# Patient Record
Sex: FEMALE | Race: WHITE
[De-identification: names, ages, dates, MRNs, and addresses within clinical notes are randomized per-mention and may not be internally consistent; named-entity substitution may affect disease eponyms.]

---

## 2017-06-27 ENCOUNTER — HOSPITAL ENCOUNTER (OUTPATIENT)
Dept: HOSPITAL 47 - RADMRIMAIN | Age: 57
Discharge: HOME | End: 2017-06-27
Payer: COMMERCIAL

## 2017-06-27 DIAGNOSIS — M51.16: ICD-10-CM

## 2017-06-27 DIAGNOSIS — M24.28: ICD-10-CM

## 2017-06-27 DIAGNOSIS — M51.17: ICD-10-CM

## 2017-06-27 DIAGNOSIS — M46.86: ICD-10-CM

## 2017-06-27 DIAGNOSIS — M43.16: Primary | ICD-10-CM

## 2017-06-27 PROCEDURE — 72158 MRI LUMBAR SPINE W/O & W/DYE: CPT

## 2017-06-27 NOTE — MR
EXAMINATION TYPE: MR lumbar spine wo/w con

 

DATE OF EXAM: 6/27/2017

 

COMPARISON: NONE

 

HISTORY: 57-year-old female with left hip pain x8 months

 

Technique: Multiplanar, multisequence images of the lumbar spine were obtained before and after admin
istration of 20 mL intravenous MultiHance gadolinium contrast.  

 

FINDINGS: 

Vertebral body heights are preserved.

 

Scattered Modic type II fatty endplate change particularly anteriorly from L2 through L5 levels and s
ome Modic type III sclerotic changes anteriorly at L5-S1. Otherwise, no suspicious bone marrow replac
ement.

 

Conus medullaris is normal.

 

There is facet arthropathy mid to lower lumbar spine with grade 1 retrolisthesis at L4-L5.

 

The intervertebral discs are degenerated, desiccated, narrowed, and diffusely bulging. Severe narrowi
ng at L5-S1 and moderate at L4-L5 with disc vacuum.

 

At T12-L1, no spinal canal or foraminal stenosis.

 

At L1-L2, no spinal canal or neuroforaminal stenosis.

 

At L2-L3, minimal diffuse disc bulge without spinal canal or neuroforaminal stenosis.

 

At L3-L4, mild diffuse disc bulge with small posterior annular fissure. No significant spinal canal o
r neuroforaminal stenosis.

 

L4-L5, there is facet arthropathy with ligamentum flavum thickening and diffuse disc bulge changes poe
spected to contact and possibly impinges the traversing left L5 nerve root. There is mild bilateral n
euroforaminal stenosis without significant spinal canal stenosis.

 

At L5-S1, facet arthropathy with a bulging disc. Disc material closely approaches and may contact the
 traversing S1 nerve roots. No significant spinal canal or neuroforaminal stenosis.

 

No abnormal enhancement within the spinal canal.

 

Ectasia of the infrarenal abdominal aorta measuring up to 2.6 cm. No prevertebral or paravertebral so
ft tissue abnormality seen.

 

 

IMPRESSION: 

 

1. Degenerative disc disease, most advanced at L5-S1 and then at L4-L5. Additional facet arthropathy 
and ligamentum flavum thickening in the lower lumbar spine.

2. Trace degenerative grade 1 retrolisthesis at L4-L5.

3. At L4-L5, the traversing left L5 nerve root may be impinged by the degenerative changes and bulgin
g disc.

4. Disc material closely approaches and may contact the traversing S1 nerve roots at L5-S1.

## 2017-07-19 ENCOUNTER — HOSPITAL ENCOUNTER (OUTPATIENT)
Dept: HOSPITAL 47 - RADUSWWP | Age: 57
Discharge: HOME | End: 2017-07-19
Payer: COMMERCIAL

## 2017-07-19 DIAGNOSIS — I77.811: Primary | ICD-10-CM

## 2017-07-19 PROCEDURE — 93979 VASCULAR STUDY: CPT

## 2017-07-19 NOTE — US
EXAMINATION TYPE: US duplex aorta

 

DATE OF EXAM: 7/19/2017

 

COMPARISON: MRI lumbar spine June 27, 2017 2017

 

CLINICAL HISTORY: I77.811 Abdominal Aortic Ectasia' 2.6cm infrarenal aortic ectasia noted on MRI; Smo
ker since teenager  

 

EXAM MEASUREMENTS:

 

Abdominal Aorta:

** Proximal:  2.8cm A/P

** Mid:  2.3cm A/P

** Distal:  2.6cm Transverse

** Bifurcation:  1.4cm A/P Right KOFI,    1.1cm A/P Left KOFI

 

Intimal thickening is noted especially at distal aorta and into KOFI. 

 

Prominence of aorta is confirmed but no greater than 3 cm aneurysmal dilatation is seen

 

IMPRESSION: No greater than 3 cm aneurysmal change to the abdominal aorta.

## 2017-07-25 ENCOUNTER — HOSPITAL ENCOUNTER (OUTPATIENT)
Dept: HOSPITAL 47 - WWCWWP | Age: 57
End: 2017-07-25
Payer: COMMERCIAL

## 2017-07-25 DIAGNOSIS — Z12.31: Primary | ICD-10-CM
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
## 2017-07-26 NOTE — MM
Reason for exam: screening  (asymptomatic).

Last mammogram was performed 1 year and 3 months ago.



History:

Patient is postmenopausal, has history of endometrial cancer at age 33, and is 

nulliparous.

Benign stereotactic core biopsy of the right breast, August 9, 2001.  Core biopsy 

of the right breast.



Physical Findings:

A clinical breast exam by your physician is recommended on an annual basis and 

results should be correlated with mammographic findings.



MG Screening Mammo w CAD

Bilateral CC and MLO view(s) were taken.

Prior study comparison: April 19, 2016, bilateral MG screening mammo w CAD.  March 31, 2015, bilateral MG screening mammo w CAD.

There are scattered fibroglandular densities.  Stable benign calcifications. There

is no discrete abnormality.  No significant changes when compared with prior 

studies.





ASSESSMENT: Benign, BI-RAD 2



RECOMMENDATION:

Routine screening mammogram of both breasts in 1 year.

## 2017-09-05 ENCOUNTER — HOSPITAL ENCOUNTER (OUTPATIENT)
Dept: HOSPITAL 47 - RADCTMAIN | Age: 57
Discharge: HOME | End: 2017-09-05
Payer: COMMERCIAL

## 2017-09-05 DIAGNOSIS — R91.1: Primary | ICD-10-CM

## 2017-09-05 PROCEDURE — 71250 CT THORAX DX C-: CPT

## 2017-09-06 NOTE — CT
EXAMINATION TYPE: CT chest wo con

 

DATE OF EXAM: 9/5/2017

 

COMPARISON: Outside CT 8/15/2017

 

HISTORY: Abnormal CT at [facility] per patient. Pulmonary nodule at base of lung.

 

CT DLP: 732.00 mGycm.  Automated Exposure Control for Dose Reduction was Utilized.

 

 

TECHNIQUE:  CT scan of the thorax is performed without IV contrast.

 

FINDINGS:

 

LUNGS: The lungs are remarkable for a subpleural nodule and axial image 20 measuring approximately 5 
mm immediately adjacent 2 mm nodule. The previously described nodule in the right middle lobe is not 
seen definitively. There is no pleural effusion.

 

MEDIASTINUM: Lack of IV contrast is noted to limit evaluation for mediastinal and especially hilar ad
enopathy. There are no definitive greater than 1 cm hilar or mediastinal lymph nodes.   No cardiomega
ly or significant pericardial effusion is seen, minimal pericardial fluid noted. 

 

OTHER:  No additional significant abnormality is seen. Patient is post cholecystectomy.

 

IMPRESSION: Nonspecific subcentimeter pulmonary nodule, repeat CT to assess for stability in 6-12 mon
ths.

## 2018-08-28 ENCOUNTER — HOSPITAL ENCOUNTER (OUTPATIENT)
Dept: HOSPITAL 47 - WWCWWP | Age: 58
Discharge: HOME | End: 2018-08-28
Payer: COMMERCIAL

## 2018-08-28 VITALS — TEMPERATURE: 98.3 F | DIASTOLIC BLOOD PRESSURE: 53 MMHG | HEART RATE: 89 BPM | SYSTOLIC BLOOD PRESSURE: 115 MMHG

## 2018-08-28 VITALS — BODY MASS INDEX: 33 KG/M2

## 2018-08-28 DIAGNOSIS — Z12.31: Primary | ICD-10-CM

## 2018-08-28 PROCEDURE — 77067 SCR MAMMO BI INCL CAD: CPT
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
## 2018-08-30 NOTE — MM
Reason for exam: screening  (asymptomatic).

Last mammogram was performed 1 year and 1 month ago.



History:

Patient is postmenopausal, has history of endometrial cancer at age 33, and is 

nulliparous.

Benign stereotactic core biopsy of the right breast, August 9, 2001.  Core biopsy 

of the right breast.



Physical Findings:

A clinical breast exam by your physician is recommended on an annual basis and 

results should be correlated with mammographic findings.



MG Screening Mammo w CAD

Bilateral CC and MLO view(s) were taken.

Prior study comparison: July 25, 2017, bilateral MG screening mammo w CAD.  April 19, 2016, bilateral MG screening mammo w CAD.

There is chronic nodularity in the right breast.  No significant changes when 

compared with prior studies.





ASSESSMENT: Benign, BI-RAD 2



RECOMMENDATION:

Routine screening mammogram of both breasts in 1 year.

## 2018-09-18 ENCOUNTER — HOSPITAL ENCOUNTER (OUTPATIENT)
Dept: HOSPITAL 47 - RADBDWWP | Age: 58
End: 2018-09-18
Payer: COMMERCIAL

## 2018-09-18 DIAGNOSIS — M81.0: Primary | ICD-10-CM

## 2018-09-18 PROCEDURE — 77080 DXA BONE DENSITY AXIAL: CPT

## 2018-09-19 NOTE — BD
EXAMINATION TYPE: Axial Bone Density

 

DATE OF EXAM: 9/18/2018

 

COMPARISON: NONE

 

CLINICAL HISTORY:

 

Height:  65.5 IN

Weight:  205 LBS

 

FRAX RISK QUESTIONS:

Secondary Osteoporosis:

    3.  Menopause before 45: YES AGE 33

  Current Tobacco Use: YES

 

RISK FACTORS 

HISTORY OF: 

Active: NO

Diet low in dairy products/other sources of calcium:  YES

Postmenopausal woman: AGE 33

 

MEDICATIONS: 

Additional Medications: VIT D, KEFLEX, ASPIRIN, BLOOD PRESSURE MEDS, WATER PILL, CHOLESTEROL MED, NOR
CO,  

 

 

Additional History: CERVICAL CANCER WITH RADIATION AGE 33

 

 

EXAM MEASUREMENTS: 

Bone mineral densitometry was performed using the [employer] System.

Bone mineral density as measured about the Lumbar spine is:  

----- L1-L4(G/cm2): 0.967

T Score Values are as follows:

----- L2: -2.0

----- L3: -1.7

----- L4: -0.6

----- L1-L4: -1.8

Bone mineral density BASELINE

 

Bone mineral density about the R hip (g/cm2): 0.664

Bone mineral density about the L hip (g/cm2): 0.652

T Score values are as follows:

-----R Neck: -2.7

-----L Neck: -2.8

-----R Total: -2.8

-----L Total: -2.4

Bone mineral density BASELINE

 

 

IMPRESSION:

Osteoporosis (T Score less than -2.5).

 

There is increased fracture risk and therapy is usually indicated based on age.

 

Re-Screen 1-2 years.

 

 

 

 

 

NOTE:  T-SCORE=SD OF THE YOUNG ADULT MEAN.

## 2019-09-11 ENCOUNTER — HOSPITAL ENCOUNTER (OUTPATIENT)
Dept: HOSPITAL 47 - WWCWWP | Age: 59
Discharge: HOME | End: 2019-09-11
Attending: OBSTETRICS & GYNECOLOGY
Payer: COMMERCIAL

## 2019-09-11 VITALS
TEMPERATURE: 98.7 F | SYSTOLIC BLOOD PRESSURE: 95 MMHG | RESPIRATION RATE: 18 BRPM | HEART RATE: 114 BPM | DIASTOLIC BLOOD PRESSURE: 60 MMHG

## 2019-09-11 VITALS — BODY MASS INDEX: 35.2 KG/M2

## 2019-09-11 DIAGNOSIS — Z12.31: Primary | ICD-10-CM

## 2019-09-11 PROCEDURE — 77067 SCR MAMMO BI INCL CAD: CPT
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
## 2019-09-12 NOTE — MM
Reason for exam: screening  (asymptomatic).

Last mammogram was performed 1 year ago.



History:

Patient is postmenopausal, has history of endometrial cancer at age 33, and is 

nulliparous.

Benign stereotactic core biopsy of the right breast, August 9, 2001.  Core biopsy 

of the right breast.



Physical Findings:

A clinical breast exam by your physician is recommended on an annual basis and 

results should be correlated with mammographic findings.



MG Screening Mammo w CAD

Bilateral CC and MLO view(s) were taken.

Prior study comparison: August 28, 2018, bilateral MG screening mammo w CAD.  July 25, 2017, bilateral MG screening mammo w CAD.

There are scattered fibroglandular densities.  Stable benign calcifications. There

is no discrete abnormality.  No significant changes when compared with prior 

studies.





ASSESSMENT: Benign, BI-RAD 2



RECOMMENDATION:

Routine screening mammogram of both breasts in 1 year.

## 2020-11-24 ENCOUNTER — HOSPITAL ENCOUNTER (OUTPATIENT)
Dept: HOSPITAL 47 - WWCWWP | Age: 60
Discharge: HOME | End: 2020-11-24
Attending: OBSTETRICS & GYNECOLOGY
Payer: COMMERCIAL

## 2020-11-24 VITALS
SYSTOLIC BLOOD PRESSURE: 114 MMHG | HEART RATE: 79 BPM | DIASTOLIC BLOOD PRESSURE: 77 MMHG | RESPIRATION RATE: 20 BRPM | TEMPERATURE: 98 F

## 2020-11-24 DIAGNOSIS — Z12.31: Primary | ICD-10-CM

## 2020-11-24 PROCEDURE — 77067 SCR MAMMO BI INCL CAD: CPT
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
## 2020-11-25 NOTE — MM
Reason for exam: screening  (asymptomatic).

Last mammogram was performed 1 year and 2 months ago.



History:

Patient is postmenopausal, has history of endometrial cancer at age 33, and is 

nulliparous.

Benign stereotactic core biopsy of the right breast, August 9, 2001.  Core biopsy 

of the right breast.



Physical Findings:

A clinical breast exam by your physician is recommended on an annual basis and 

results should be correlated with mammographic findings.



MG Screening Mammo w CAD

Bilateral CC and MLO view(s) were taken.

Prior study comparison: September 11, 2019, bilateral MG screening mammo w CAD.  

August 28, 2018, bilateral MG screening mammo w CAD.

There are scattered fibroglandular densities.  Stable benign calcifications. There

is no discrete abnormality.  No significant changes when compared with prior 

studies.





ASSESSMENT: Benign, BI-RAD 2



RECOMMENDATION:

Routine screening mammogram of both breasts in 1 year.

## 2022-01-25 ENCOUNTER — HOSPITAL ENCOUNTER (OUTPATIENT)
Dept: HOSPITAL 47 - WWCWWP | Age: 62
End: 2022-01-25
Attending: OBSTETRICS & GYNECOLOGY
Payer: COMMERCIAL

## 2022-01-25 VITALS
TEMPERATURE: 98.8 F | RESPIRATION RATE: 18 BRPM | SYSTOLIC BLOOD PRESSURE: 103 MMHG | DIASTOLIC BLOOD PRESSURE: 69 MMHG | HEART RATE: 82 BPM

## 2022-01-25 DIAGNOSIS — Z79.899: ICD-10-CM

## 2022-01-25 DIAGNOSIS — Z88.2: ICD-10-CM

## 2022-01-25 DIAGNOSIS — N36.41: ICD-10-CM

## 2022-01-25 DIAGNOSIS — Z12.31: Primary | ICD-10-CM

## 2022-01-25 DIAGNOSIS — F32.A: ICD-10-CM

## 2022-01-25 DIAGNOSIS — Z86.19: ICD-10-CM

## 2022-01-25 DIAGNOSIS — Z87.891: ICD-10-CM

## 2022-01-25 DIAGNOSIS — E78.5: ICD-10-CM

## 2022-01-25 DIAGNOSIS — Z88.8: ICD-10-CM

## 2022-01-25 DIAGNOSIS — Z88.1: ICD-10-CM

## 2022-01-25 DIAGNOSIS — Z87.310: ICD-10-CM

## 2022-01-25 PROCEDURE — 77067 SCR MAMMO BI INCL CAD: CPT
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
## 2022-01-26 NOTE — MM
Reason for exam: screening  (asymptomatic).

Last mammogram was performed 1 year and 2 months ago.



History:

Patient is postmenopausal, has history of endometrial cancer at age 33, and is 

nulliparous.

Benign stereotactic core biopsy of the right breast, August 9, 2001.  Core biopsy 

of the right breast.



Physical Findings:

A clinical breast exam by your physician is recommended on an annual basis and 

results should be correlated with mammographic findings.



MG Screening Mammo w CAD

Bilateral CC and MLO view(s) were taken.

Prior study comparison: November 24, 2020, bilateral MG screening mammo w CAD.  

September 11, 2019, bilateral MG screening mammo w CAD.

There are scattered fibroglandular densities.  There are benign appearing round 

calcifications in the right breast. Previous mammotome biopsy in the right breast.

There is chronic nodularity in the right breast and left axilla.





ASSESSMENT: Benign, BI-RAD 2



RECOMMENDATION:

Routine screening mammogram of both breasts in 1 year.

## 2022-02-24 ENCOUNTER — HOSPITAL ENCOUNTER (OUTPATIENT)
Dept: HOSPITAL 47 - RADBDWWP | Age: 62
Discharge: HOME | End: 2022-02-24
Attending: OBSTETRICS & GYNECOLOGY
Payer: COMMERCIAL

## 2022-02-24 DIAGNOSIS — Z78.0: ICD-10-CM

## 2022-02-24 DIAGNOSIS — M85.88: Primary | ICD-10-CM

## 2022-02-24 PROCEDURE — 77080 DXA BONE DENSITY AXIAL: CPT
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
## 2022-03-14 ENCOUNTER — HOSPITAL ENCOUNTER (EMERGENCY)
Dept: HOSPITAL 47 - EC | Age: 62
LOS: 1 days | Discharge: HOME | End: 2022-03-15
Payer: COMMERCIAL

## 2022-03-14 DIAGNOSIS — R50.82: ICD-10-CM

## 2022-03-14 DIAGNOSIS — Z88.1: ICD-10-CM

## 2022-03-14 DIAGNOSIS — Z85.41: ICD-10-CM

## 2022-03-14 DIAGNOSIS — Z87.891: ICD-10-CM

## 2022-03-14 DIAGNOSIS — E78.5: ICD-10-CM

## 2022-03-14 DIAGNOSIS — M81.0: ICD-10-CM

## 2022-03-14 DIAGNOSIS — Z79.82: ICD-10-CM

## 2022-03-14 DIAGNOSIS — Z90.49: ICD-10-CM

## 2022-03-14 DIAGNOSIS — F32.A: ICD-10-CM

## 2022-03-14 DIAGNOSIS — Z88.2: ICD-10-CM

## 2022-03-14 DIAGNOSIS — L50.8: Primary | ICD-10-CM

## 2022-03-14 PROCEDURE — 86140 C-REACTIVE PROTEIN: CPT

## 2022-03-14 PROCEDURE — 87040 BLOOD CULTURE FOR BACTERIA: CPT

## 2022-03-14 PROCEDURE — 85025 COMPLETE CBC W/AUTO DIFF WBC: CPT

## 2022-03-14 PROCEDURE — 81001 URINALYSIS AUTO W/SCOPE: CPT

## 2022-03-14 PROCEDURE — 80053 COMPREHEN METABOLIC PANEL: CPT

## 2022-03-14 PROCEDURE — 93005 ELECTROCARDIOGRAM TRACING: CPT

## 2022-03-14 PROCEDURE — 71045 X-RAY EXAM CHEST 1 VIEW: CPT

## 2022-03-14 PROCEDURE — 83735 ASSAY OF MAGNESIUM: CPT

## 2022-03-14 PROCEDURE — 96375 TX/PRO/DX INJ NEW DRUG ADDON: CPT

## 2022-03-14 PROCEDURE — 84484 ASSAY OF TROPONIN QUANT: CPT

## 2022-03-14 PROCEDURE — 85730 THROMBOPLASTIN TIME PARTIAL: CPT

## 2022-03-14 PROCEDURE — 36415 COLL VENOUS BLD VENIPUNCTURE: CPT

## 2022-03-14 PROCEDURE — 85610 PROTHROMBIN TIME: CPT

## 2022-03-14 PROCEDURE — 96374 THER/PROPH/DIAG INJ IV PUSH: CPT

## 2022-03-14 PROCEDURE — 72170 X-RAY EXAM OF PELVIS: CPT

## 2022-03-14 PROCEDURE — 84100 ASSAY OF PHOSPHORUS: CPT

## 2022-03-14 PROCEDURE — 99283 EMERGENCY DEPT VISIT LOW MDM: CPT

## 2022-03-14 PROCEDURE — 96361 HYDRATE IV INFUSION ADD-ON: CPT

## 2022-03-14 PROCEDURE — 83605 ASSAY OF LACTIC ACID: CPT

## 2022-03-15 VITALS — TEMPERATURE: 98.9 F | SYSTOLIC BLOOD PRESSURE: 144 MMHG | DIASTOLIC BLOOD PRESSURE: 87 MMHG | HEART RATE: 87 BPM

## 2022-03-15 VITALS — RESPIRATION RATE: 18 BRPM

## 2022-03-15 LAB
ALBUMIN SERPL-MCNC: 3.4 G/DL (ref 3.5–5)
ALP SERPL-CCNC: 94 U/L (ref 38–126)
ALT SERPL-CCNC: 14 U/L (ref 4–34)
ANION GAP SERPL CALC-SCNC: 10 MMOL/L
APTT BLD: 20 SEC (ref 22–30)
AST SERPL-CCNC: 20 U/L (ref 14–36)
BASOPHILS # BLD AUTO: 0 K/UL (ref 0–0.2)
BASOPHILS NFR BLD AUTO: 0 %
BUN SERPL-SCNC: 12 MG/DL (ref 7–17)
CALCIUM SPEC-MCNC: 8.9 MG/DL (ref 8.4–10.2)
CHLORIDE SERPL-SCNC: 105 MMOL/L (ref 98–107)
CO2 SERPL-SCNC: 22 MMOL/L (ref 22–30)
EOSINOPHIL # BLD AUTO: 0.1 K/UL (ref 0–0.7)
EOSINOPHIL NFR BLD AUTO: 1 %
ERYTHROCYTE [DISTWIDTH] IN BLOOD BY AUTOMATED COUNT: 3.98 M/UL (ref 3.8–5.4)
ERYTHROCYTE [DISTWIDTH] IN BLOOD: 14.6 % (ref 11.5–15.5)
GLUCOSE SERPL-MCNC: 107 MG/DL (ref 74–99)
HCT VFR BLD AUTO: 38.9 % (ref 34–46)
HGB BLD-MCNC: 12.7 GM/DL (ref 11.4–16)
INR PPP: 0.9 (ref ?–1.2)
LYMPHOCYTES # SPEC AUTO: 2 K/UL (ref 1–4.8)
LYMPHOCYTES NFR SPEC AUTO: 23 %
MAGNESIUM SPEC-SCNC: 2.2 MG/DL (ref 1.6–2.3)
MCH RBC QN AUTO: 31.8 PG (ref 25–35)
MCHC RBC AUTO-ENTMCNC: 32.5 G/DL (ref 31–37)
MCV RBC AUTO: 97.8 FL (ref 80–100)
MONOCYTES # BLD AUTO: 0.5 K/UL (ref 0–1)
MONOCYTES NFR BLD AUTO: 6 %
NEUTROPHILS # BLD AUTO: 5.9 K/UL (ref 1.3–7.7)
NEUTROPHILS NFR BLD AUTO: 67 %
PH UR: 5.5 [PH] (ref 5–8)
PLATELET # BLD AUTO: 338 K/UL (ref 150–450)
POTASSIUM SERPL-SCNC: 4.3 MMOL/L (ref 3.5–5.1)
PROT SERPL-MCNC: 6.4 G/DL (ref 6.3–8.2)
PT BLD: 10.1 SEC (ref 9–12)
RBC UR QL: 1 /HPF (ref 0–5)
SODIUM SERPL-SCNC: 137 MMOL/L (ref 137–145)
SP GR UR: 1.03 (ref 1–1.03)
SQUAMOUS UR QL AUTO: 6 /HPF (ref 0–4)
UROBILINOGEN UR QL STRIP: 2 MG/DL (ref ?–2)
WBC # BLD AUTO: 8.7 K/UL (ref 3.8–10.6)
WBC # UR AUTO: 2 /HPF (ref 0–5)
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
## 2022-03-15 NOTE — XR
EXAMINATION TYPE: XR chest 1V

 

DATE OF EXAM: 3/15/2022

 

COMPARISON: NONE

 

HISTORY: Postop fever

 

TECHNIQUE: Single view

 

FINDINGS: There is no heart failure nor confluent pneumonic infiltrate. Costophrenic angles are clear
. There are no hilar masses. Bony thorax is intact.

 

IMPRESSION: No active cardiopulmonary disease.

## 2022-03-15 NOTE — XR
EXAMINATION TYPE: XR pelvis AP view

 

DATE OF EXAM: 3/15/2022

 

COMPARISON: NONE

 

HISTORY: Postop fever

 

TECHNIQUE: Single view

 

FINDINGS: The pelvic ring is intact. There is skin staples over the left and right inguinal region. T
here is right iliac artery stent. The proximal femurs are intact. No fracture seen. There is apparent
 lumbar spine surgery with laminectomy at L4-5.

 

IMPRESSION: No acute abnormality of the pelvis.

## 2022-06-25 ENCOUNTER — HOSPITAL ENCOUNTER (INPATIENT)
Dept: HOSPITAL 47 - EC | Age: 62
LOS: 2 days | Discharge: ANOTHER HOSPITAL | DRG: 252 | End: 2022-06-27
Attending: HOSPITALIST | Admitting: HOSPITALIST
Payer: COMMERCIAL

## 2022-06-25 DIAGNOSIS — F32.A: ICD-10-CM

## 2022-06-25 DIAGNOSIS — E87.2: ICD-10-CM

## 2022-06-25 DIAGNOSIS — N14.1: ICD-10-CM

## 2022-06-25 DIAGNOSIS — Z83.49: ICD-10-CM

## 2022-06-25 DIAGNOSIS — M81.0: ICD-10-CM

## 2022-06-25 DIAGNOSIS — E66.9: ICD-10-CM
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
## 2022-06-25 DIAGNOSIS — G89.29: ICD-10-CM

## 2022-06-25 DIAGNOSIS — I73.9: ICD-10-CM

## 2022-06-25 DIAGNOSIS — E78.5: ICD-10-CM

## 2022-06-25 DIAGNOSIS — I48.91: ICD-10-CM

## 2022-06-25 DIAGNOSIS — Z83.3: ICD-10-CM

## 2022-06-25 DIAGNOSIS — Z85.41: ICD-10-CM

## 2022-06-25 DIAGNOSIS — Z79.899: ICD-10-CM

## 2022-06-25 DIAGNOSIS — R77.8: ICD-10-CM

## 2022-06-25 DIAGNOSIS — Z90.49: ICD-10-CM

## 2022-06-25 DIAGNOSIS — Z88.8: ICD-10-CM

## 2022-06-25 DIAGNOSIS — Z90.89: ICD-10-CM

## 2022-06-25 DIAGNOSIS — E16.2: ICD-10-CM

## 2022-06-25 DIAGNOSIS — I10: ICD-10-CM

## 2022-06-25 DIAGNOSIS — R65.21: ICD-10-CM

## 2022-06-25 DIAGNOSIS — E87.5: ICD-10-CM
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
## 2022-06-25 DIAGNOSIS — I71.4: ICD-10-CM

## 2022-06-25 DIAGNOSIS — E87.70: ICD-10-CM

## 2022-06-25 DIAGNOSIS — Z88.0: ICD-10-CM

## 2022-06-25 DIAGNOSIS — Z82.49: ICD-10-CM

## 2022-06-25 DIAGNOSIS — I74.5: ICD-10-CM

## 2022-06-25 DIAGNOSIS — Z87.19: ICD-10-CM

## 2022-06-25 DIAGNOSIS — T82.7XXA: Primary | ICD-10-CM

## 2022-06-25 DIAGNOSIS — Z98.890: ICD-10-CM

## 2022-06-25 DIAGNOSIS — M54.9: ICD-10-CM

## 2022-06-25 DIAGNOSIS — Z88.2: ICD-10-CM

## 2022-06-25 DIAGNOSIS — K72.00: ICD-10-CM

## 2022-06-25 DIAGNOSIS — R32: ICD-10-CM

## 2022-06-25 DIAGNOSIS — Z88.1: ICD-10-CM

## 2022-06-25 DIAGNOSIS — Z79.82: ICD-10-CM

## 2022-06-25 DIAGNOSIS — Z79.83: ICD-10-CM

## 2022-06-25 DIAGNOSIS — Z20.822: ICD-10-CM

## 2022-06-25 DIAGNOSIS — A41.59: ICD-10-CM

## 2022-06-25 DIAGNOSIS — Z92.3: ICD-10-CM

## 2022-06-25 DIAGNOSIS — N17.0: ICD-10-CM

## 2022-06-25 DIAGNOSIS — T50.8X5A: ICD-10-CM

## 2022-06-25 DIAGNOSIS — T82.868A: ICD-10-CM

## 2022-06-25 DIAGNOSIS — Z86.19: ICD-10-CM

## 2022-06-25 DIAGNOSIS — Z87.891: ICD-10-CM

## 2022-06-25 LAB
ALBUMIN SERPL-MCNC: 3.5 G/DL (ref 3.5–5)
ALP SERPL-CCNC: 149 U/L (ref 38–126)
ALT SERPL-CCNC: 101 U/L (ref 4–34)
ANION GAP SERPL CALC-SCNC: 19 MMOL/L
APTT BLD: 26.5 SEC (ref 22–30)
AST SERPL-CCNC: 165 U/L (ref 14–36)
BASOPHILS # BLD AUTO: 0.1 K/UL (ref 0–0.2)
BASOPHILS NFR BLD AUTO: 1 %
BUN SERPL-SCNC: 31 MG/DL (ref 7–17)
CALCIUM SPEC-MCNC: 8.2 MG/DL (ref 8.4–10.2)
CHLORIDE SERPL-SCNC: 105 MMOL/L (ref 98–107)
CO2 SERPL-SCNC: 10 MMOL/L (ref 22–30)
EOSINOPHIL # BLD AUTO: 0 K/UL (ref 0–0.7)
EOSINOPHIL NFR BLD AUTO: 0 %
ERYTHROCYTE [DISTWIDTH] IN BLOOD BY AUTOMATED COUNT: 4.51 M/UL (ref 3.8–5.4)
ERYTHROCYTE [DISTWIDTH] IN BLOOD: 14 % (ref 11.5–15.5)
GLUCOSE BLD-MCNC: 196 MG/DL (ref 70–110)
GLUCOSE SERPL-MCNC: 212 MG/DL (ref 74–99)
HCO3 BLDA-SCNC: 17 MMOL/L (ref 21–25)
HCT VFR BLD AUTO: 43.9 % (ref 34–46)
HGB BLD-MCNC: 13.8 GM/DL (ref 11.4–16)
INR PPP: 1.2 (ref ?–1.2)
LYMPHOCYTES # SPEC AUTO: 0.8 K/UL (ref 1–4.8)
LYMPHOCYTES NFR SPEC AUTO: 4 %
MAGNESIUM SPEC-SCNC: 2.2 MG/DL (ref 1.6–2.3)
MCH RBC QN AUTO: 30.5 PG (ref 25–35)
MCHC RBC AUTO-ENTMCNC: 31.3 G/DL (ref 31–37)
MCV RBC AUTO: 97.3 FL (ref 80–100)
MONOCYTES # BLD AUTO: 1 K/UL (ref 0–1)
MONOCYTES NFR BLD AUTO: 5 %
NEUTROPHILS # BLD AUTO: 18.6 K/UL (ref 1.3–7.7)
NEUTROPHILS NFR BLD AUTO: 89 %
PCO2 BLDA: 41 MMHG (ref 35–45)
PH BLDA: 7.23 [PH] (ref 7.35–7.45)
PLATELET # BLD AUTO: 223 K/UL (ref 150–450)
PO2 BLDA: 123 MMHG (ref 83–108)
POTASSIUM SERPL-SCNC: 6 MMOL/L (ref 3.5–5.1)
PROT SERPL-MCNC: 6.8 G/DL (ref 6.3–8.2)
PT BLD: 12.4 SEC (ref 9–12)
SODIUM SERPL-SCNC: 134 MMOL/L (ref 137–145)
WBC # BLD AUTO: 20.9 K/UL (ref 3.8–10.6)

## 2022-06-25 PROCEDURE — 87635 SARS-COV-2 COVID-19 AMP PRB: CPT

## 2022-06-25 PROCEDURE — 81001 URINALYSIS AUTO W/SCOPE: CPT

## 2022-06-25 PROCEDURE — 74018 RADEX ABDOMEN 1 VIEW: CPT

## 2022-06-25 PROCEDURE — 87075 CULTR BACTERIA EXCEPT BLOOD: CPT

## 2022-06-25 PROCEDURE — 04CL0ZZ EXTIRPATION OF MATTER FROM LEFT FEMORAL ARTERY, OPEN APPROACH: ICD-10-PCS

## 2022-06-25 PROCEDURE — 71275 CT ANGIOGRAPHY CHEST: CPT

## 2022-06-25 PROCEDURE — 93005 ELECTROCARDIOGRAM TRACING: CPT

## 2022-06-25 PROCEDURE — 75635 CT ANGIO ABDOMINAL ARTERIES: CPT

## 2022-06-25 PROCEDURE — 87086 URINE CULTURE/COLONY COUNT: CPT

## 2022-06-25 PROCEDURE — 87205 SMEAR GRAM STAIN: CPT

## 2022-06-25 PROCEDURE — 87070 CULTURE OTHR SPECIMN AEROBIC: CPT

## 2022-06-25 PROCEDURE — 87186 SC STD MICRODIL/AGAR DIL: CPT

## 2022-06-25 PROCEDURE — 85025 COMPLETE CBC W/AUTO DIFF WBC: CPT

## 2022-06-25 PROCEDURE — 82330 ASSAY OF CALCIUM: CPT

## 2022-06-25 PROCEDURE — 85610 PROTHROMBIN TIME: CPT

## 2022-06-25 PROCEDURE — 96375 TX/PRO/DX INJ NEW DRUG ADDON: CPT

## 2022-06-25 PROCEDURE — 71045 X-RAY EXAM CHEST 1 VIEW: CPT

## 2022-06-25 PROCEDURE — 80202 ASSAY OF VANCOMYCIN: CPT

## 2022-06-25 PROCEDURE — 99291 CRITICAL CARE FIRST HOUR: CPT

## 2022-06-25 PROCEDURE — 76770 US EXAM ABDO BACK WALL COMP: CPT

## 2022-06-25 PROCEDURE — 83605 ASSAY OF LACTIC ACID: CPT

## 2022-06-25 PROCEDURE — 96361 HYDRATE IV INFUSION ADD-ON: CPT

## 2022-06-25 PROCEDURE — 83735 ASSAY OF MAGNESIUM: CPT

## 2022-06-25 PROCEDURE — 80053 COMPREHEN METABOLIC PANEL: CPT

## 2022-06-25 PROCEDURE — 96374 THER/PROPH/DIAG INJ IV PUSH: CPT

## 2022-06-25 PROCEDURE — 80048 BASIC METABOLIC PNL TOTAL CA: CPT

## 2022-06-25 PROCEDURE — 82805 BLOOD GASES W/O2 SATURATION: CPT

## 2022-06-25 PROCEDURE — 85730 THROMBOPLASTIN TIME PARTIAL: CPT

## 2022-06-25 PROCEDURE — 87040 BLOOD CULTURE FOR BACTERIA: CPT

## 2022-06-25 PROCEDURE — 84484 ASSAY OF TROPONIN QUANT: CPT

## 2022-06-25 PROCEDURE — 87077 CULTURE AEROBIC IDENTIFY: CPT

## 2022-06-25 PROCEDURE — 36415 COLL VENOUS BLD VENIPUNCTURE: CPT

## 2022-06-25 RX ADMIN — HYDROMORPHONE HYDROCHLORIDE PRN MG: 1 INJECTION, SOLUTION INTRAMUSCULAR; INTRAVENOUS; SUBCUTANEOUS at 22:29

## 2022-06-25 RX ADMIN — POTASSIUM CHLORIDE SCH MLS/HR: 14.9 INJECTION, SOLUTION INTRAVENOUS at 17:47

## 2022-06-25 RX ADMIN — ATORVASTATIN CALCIUM SCH MG: 40 TABLET, FILM COATED ORAL at 22:58

## 2022-06-25 RX ADMIN — HEPARIN SODIUM SCH MLS/HR: 10000 INJECTION, SOLUTION INTRAVENOUS at 12:29

## 2022-06-25 RX ADMIN — HYDROMORPHONE HYDROCHLORIDE PRN MG: 1 INJECTION, SOLUTION INTRAMUSCULAR; INTRAVENOUS; SUBCUTANEOUS at 18:50

## 2022-06-25 RX ADMIN — HEPARIN SODIUM SCH MLS/HR: 10000 INJECTION, SOLUTION INTRAVENOUS at 17:52

## 2022-06-25 RX ADMIN — OXYBUTYNIN CHLORIDE SCH MG: 5 TABLET ORAL at 22:58

## 2022-06-25 RX ADMIN — METOPROLOL TARTRATE SCH MG: 25 TABLET, FILM COATED ORAL at 22:58
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
## 2022-06-25 NOTE — P.OP
Date of Procedure: 06/25/22


Preoperative Diagnosis: 


Thrombosed femoral to femoral bypass graft with resulting left lower extremity 

ischemia.


Postoperative Diagnosis: 


Same plus graft infection.


Procedure(s) Performed: 


#1: Exploration of left groin.


#2: Thrombectomy of femoral-femoral bypass graft.


#3: Culture of wound.


Implants: 


None.


Anesthesia: CHERYL


Surgeon: iSn Garcia


Estimated Blood Loss (ml): 100


Pathology: other (Culture of wound and thrombus)


Condition: stable


Disposition: ICU


Indications for Procedure: 


Patient is a 62-year-old female who has a history of peripheral vascular disease

who had undergone a femoral-femoral bypass graft at an outside institution.  She

presented earlier today with thrombosis and resulting ischemia of the left lower

extremity.  Physical examination revealed a femoral pulse on the right.  All 

other pulses are absent bilaterally.  The left foot and lower leg was mottled.  

CTA had been performed which confirmed thrombosis of the bypass graft and 

patient is offered thrombectomy.


Operative Findings: 


Infected femoral to femoral bypass graft.


Description of Procedure: 


Patient brought the upper and placed in supine position and administered general

endotracheal anesthesia delivered by the department of anesthesiology.  

Patient's left lower extremity lower abdominal as well as the right thigh areas 

were sterilely prepped and draped in the usual manner.  Aguilar catheter had been 

placed to gravity drainage and the patient received intravenously administered 

antibiotics in the prophylactic perioperative manner.





The previous left groin wound was incised and immediately upon entering this 

skin bloody/turbid fluid was encountered and eventually purulence was noted.  

This was cultured.  The incision was deepened through the subcu change tissues. 

The femoral to femoral bypass graft was identified and was noted to be absent of

pulse.  Essentially no graft inclusion was noted by the subcutaneous tissues.





The exposed portion of the graft was encircled Vesseloops both proximally and 

distally.  Transverse graftotomy was made and this was eventually extended to a 

complete transection of the bypass graft.  A 5-Romanian Aguilar catheter was passed 

multiple times and eventually all thrombus within the graft was removed with 

excellent pulsatile flow noted.  The Shira was passed down the left femoral 

artery with good backbleeding identified.





The graft was reapproximated with 5-0 Prolene suture placed in running fashion. 

Just prior to completion of the anastomotic line the graft was flushed and no 

thrombus was retrieved and was also backbled again with no thrombus being r

etrieved.  The anastomotic line was completed and flow was restored through the 

graft into the left femoral artery.





The wound was copiously irrigated.  Deep tissues were closed in one layer over 

the bypass graft.  The wound was then packed with iodoform gauze and skin edges 

were loosely reapproximated with 3-0 nylon suture.  Prevena negative pressure 

system was applied.





Patient tolerated procedure well and was transferred to the recovery assessment 

and stable condition





I discussed the situation with the patient's original operative surgeon, Dr. Brown at [facility].  He was appraised of the situation and was 

willing to accept the patient in transfer for continuation of a higher level of 

care and can be offered at this institution.

## 2022-06-25 NOTE — XR
EXAMINATION TYPE: XR chest 1V portable

 

DATE OF EXAM: 6/25/2022

 

HISTORY: Shortness of breath.

 

COMPARISON: 3/15/2022

 

TECHNIQUE: Single view of the chest is submitted.

 

FINDINGS:

Demonstrated are scattered senescent parenchymal change.  

 

There is no evidence for focal infiltrate. 

 

The heart is stable. Mild pulmonary venous engorgement without overt failure.

 

Hilar and mediastinal structures are within normal limits.  

 

Degenerative changes are seen of the dorsal spine. 

 

 IMPRESSION: 

 

1.  Chronic changes without evidence for acute pulmonary disease.
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
## 2022-06-25 NOTE — CT
EXAMINATION TYPE: CT angio tho/abd W Run Off

 

DATE OF EXAM: 6/25/2022

 

COMPARISON: 8/15/2017

 

HISTORY: Left leg discoloration. Recent stent surgery in March 2022.

 

CT DLP: 3868.2 mGycm

 

 

CONTRAST: 

CTA thoracic and abdominal aorta with 3-D reconstruction is performed and without and with IV Contras
t, patient injected with 100ml mL of Isovue 370.

 

Contrast CTA of the abdominal aorta with runoff of the lower extremity arterial system was performed 
from the lung bases through the ankles and feet. 3-D reconstruction imaging obtained at a separate wo
rkstation.  

 

Thoracic aorta: Normal caliber. No dissection. No aneurysm. Heart and mediastinal structures are with
in normal limits. Mild right basilar atelectasis.

 

ABDOMINAL AORTA: 3.1 cm infrarenal abdominal aortic aneurysm with mural thrombus. Diminutive right re
nal artery. Remaining branch vessels are within normal limits.

 

Iliac vessels: There is an occluded left common iliac artery with a patent right common iliac artery 
there is right external iliac artery stent in place which appears to be patent. The right internal il
iac artery is patent.

 

Femoral arteries/popliteal arteries: There is a femoral-femoral bypass graft which fails to opacify w
ith contrast and therefore I suspect is occluded. A second femorofemoral bypass graft appears to be d
etached. Right superficial femoral artery appears to be patent however visualization above and at the
 knee is limited by extensive clip streak artifact. Profunda femoris appears to be patent on the righ
t. 

 

On the left there appears to be trickle flow through the left superficial femoral artery and poplitea
l artery which appeared diminutive. No significant flow is seen distal to the left knee. Angiographic
 correlation is recommended.

 

Below the knee arteries: No significant flow is seen distal to the left knee. Angiographic correlatio
n is recommended. Trifurcation on the right appears to be patent as are the peroneal as well as anter
ior and posterior tibial arteries. Limited runoff of the ankles and feet given timing of the contrast
 bolus.

 

LIVER/GB-cholecystectomy clips are in place.

 

PANCREAS-  No significant abnormality is seen. 

 

SPLEEN-  No significant abnormality is seen. 

 

ADRENALS-  No significant abnormality is seen. 

 

KIDNEYS/BLADDER-there is edema and patchy enhancement of the right kidney which may reflect underlyin
g pyelonephritis. Right renal artery is diminutive in size. Left kidney appears within normal limits.


 

BOWEL-  No Significant abnormality 

 

GENITAL ORGANS: No gross abnormality seen. 

 

LYMPH NODES-  No greater than 1cm abdominal or pelvic lymph nodes are appreciated. 

 

OSSEOUS STRUCTURES-  No significant abnormality is seen. 

 

OTHER- No significant abnormality is seen. 

 

IMPRESSION-

 

1. Femorofemoral bypass graft fails demonstrate evidence of contrast opacification and therefore appe
ars occluded.

2. Trickle flow through the left SFA with nonvisualization of flow below the knee. Occlusion is suspe
cted. Vascular consult recommended.

3. Infrarenal abdominal aortic aneurysm.

4. Right external iliac stent which appears to be patent. Occlusion left common iliac artery.

5. Edema of the right kidney with striated enhancement pattern is suspicious for pyelonephritis. Dimi
nutive right renal artery.

## 2022-06-26 LAB
ANION GAP SERPL CALC-SCNC: 11 MMOL/L
APTT BLD: >200 SEC (ref 22–30)
BUN SERPL-SCNC: 45 MG/DL (ref 7–17)
CALCIUM SPEC-MCNC: 6.5 MG/DL (ref 8.4–10.2)
CELLS COUNTED: 200
CHLORIDE SERPL-SCNC: 107 MMOL/L (ref 98–107)
CO2 SERPL-SCNC: 17 MMOL/L (ref 22–30)
ERYTHROCYTE [DISTWIDTH] IN BLOOD BY AUTOMATED COUNT: 3.29 M/UL (ref 3.8–5.4)
ERYTHROCYTE [DISTWIDTH] IN BLOOD: 13.9 % (ref 11.5–15.5)
GLUCOSE SERPL-MCNC: 126 MG/DL (ref 74–99)
HCT VFR BLD AUTO: 32.2 % (ref 34–46)
HGB BLD-MCNC: 10.1 GM/DL (ref 11.4–16)
INR PPP: 1.2 (ref ?–1.2)
LYMPHOCYTES # BLD MANUAL: 1.81 K/UL (ref 1–4.8)
MCH RBC QN AUTO: 30.6 PG (ref 25–35)
MCHC RBC AUTO-ENTMCNC: 31.3 G/DL (ref 31–37)
MCV RBC AUTO: 97.7 FL (ref 80–100)
MONOCYTES # BLD MANUAL: 0.52 K/UL (ref 0–1)
NEUTROPHILS NFR BLD MANUAL: 40 %
NEUTS SEG # BLD MANUAL: 10.5 K/UL (ref 1.3–7.7)
PLATELET # BLD AUTO: 140 K/UL (ref 150–450)
POTASSIUM SERPL-SCNC: 5.1 MMOL/L (ref 3.5–5.1)
PT BLD: 12.8 SEC (ref 9–12)
SODIUM SERPL-SCNC: 135 MMOL/L (ref 137–145)
WBC # BLD AUTO: 12.9 K/UL (ref 3.8–10.6)

## 2022-06-26 RX ADMIN — HEPARIN SODIUM SCH MLS/HR: 10000 INJECTION, SOLUTION INTRAVENOUS at 15:25

## 2022-06-26 RX ADMIN — ATORVASTATIN CALCIUM SCH MG: 40 TABLET, FILM COATED ORAL at 21:04

## 2022-06-26 RX ADMIN — HEPARIN SODIUM SCH MLS/HR: 10000 INJECTION, SOLUTION INTRAVENOUS at 21:07

## 2022-06-26 RX ADMIN — METOPROLOL TARTRATE SCH: 25 TABLET, FILM COATED ORAL at 13:53

## 2022-06-26 RX ADMIN — FAMOTIDINE SCH MG: 20 TABLET, FILM COATED ORAL at 08:47

## 2022-06-26 RX ADMIN — METOPROLOL TARTRATE SCH MG: 25 TABLET, FILM COATED ORAL at 08:47

## 2022-06-26 RX ADMIN — ASPIRIN 81 MG CHEWABLE TABLET SCH MG: 81 TABLET CHEWABLE at 08:47

## 2022-06-26 RX ADMIN — DULOXETINE SCH MG: 60 CAPSULE, DELAYED RELEASE ORAL at 08:47

## 2022-06-26 RX ADMIN — POTASSIUM CHLORIDE SCH: 14.9 INJECTION, SOLUTION INTRAVENOUS at 18:07

## 2022-06-26 RX ADMIN — POTASSIUM CHLORIDE SCH: 14.9 INJECTION, SOLUTION INTRAVENOUS at 03:01

## 2022-06-26 RX ADMIN — OXYBUTYNIN CHLORIDE SCH MG: 5 TABLET ORAL at 08:46

## 2022-06-26 RX ADMIN — POTASSIUM CHLORIDE SCH MLS/HR: 14.9 INJECTION, SOLUTION INTRAVENOUS at 21:05

## 2022-06-26 RX ADMIN — POTASSIUM CHLORIDE SCH MLS/HR: 14.9 INJECTION, SOLUTION INTRAVENOUS at 00:00

## 2022-06-26 RX ADMIN — POTASSIUM CHLORIDE SCH MLS/HR: 14.9 INJECTION, SOLUTION INTRAVENOUS at 08:46

## 2022-06-26 RX ADMIN — METOPROLOL TARTRATE SCH MG: 25 TABLET, FILM COATED ORAL at 21:05

## 2022-06-26 RX ADMIN — OXYBUTYNIN CHLORIDE SCH MG: 5 TABLET ORAL at 21:04

## 2022-06-26 RX ADMIN — POTASSIUM CHLORIDE SCH MLS/HR: 14.9 INJECTION, SOLUTION INTRAVENOUS at 01:46

## 2022-06-26 RX ADMIN — HYDROMORPHONE HYDROCHLORIDE PRN MG: 1 INJECTION, SOLUTION INTRAMUSCULAR; INTRAVENOUS; SUBCUTANEOUS at 01:46

## 2022-06-26 RX ADMIN — HYDROMORPHONE HYDROCHLORIDE PRN MG: 1 INJECTION, SOLUTION INTRAMUSCULAR; INTRAVENOUS; SUBCUTANEOUS at 11:02

## 2022-06-26 RX ADMIN — HYDROMORPHONE HYDROCHLORIDE PRN MG: 1 INJECTION, SOLUTION INTRAMUSCULAR; INTRAVENOUS; SUBCUTANEOUS at 21:05
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
## 2022-06-27 VITALS — HEART RATE: 103 BPM | DIASTOLIC BLOOD PRESSURE: 85 MMHG | RESPIRATION RATE: 34 BRPM | SYSTOLIC BLOOD PRESSURE: 128 MMHG

## 2022-06-27 VITALS — TEMPERATURE: 99.4 F

## 2022-06-27 LAB
ALBUMIN SERPL-MCNC: 2 G/DL (ref 3.5–5)
ALP SERPL-CCNC: 114 U/L (ref 38–126)
ALT SERPL-CCNC: 628 U/L (ref 4–34)
ANION GAP SERPL CALC-SCNC: 10 MMOL/L
AST SERPL-CCNC: 1588 U/L (ref 14–36)
BUN SERPL-SCNC: 65 MG/DL (ref 7–17)
CALCIUM SPEC-MCNC: 6.1 MG/DL (ref 8.4–10.2)
CELLS COUNTED: 100
CHLORIDE SERPL-SCNC: 109 MMOL/L (ref 98–107)
CO2 BLDA-SCNC: 11 MMOL/L (ref 19–24)
CO2 SERPL-SCNC: 13 MMOL/L (ref 22–30)
ERYTHROCYTE [DISTWIDTH] IN BLOOD BY AUTOMATED COUNT: 2.77 M/UL (ref 3.8–5.4)
ERYTHROCYTE [DISTWIDTH] IN BLOOD: 14.3 % (ref 11.5–15.5)
GLUCOSE BLD-MCNC: 142 MG/DL (ref 70–110)
GLUCOSE BLD-MCNC: 174 MG/DL (ref 70–110)
GLUCOSE BLD-MCNC: 20 MG/DL (ref 70–110)
GLUCOSE BLD-MCNC: 23 MG/DL (ref 70–110)
GLUCOSE BLD-MCNC: 32 MG/DL (ref 70–110)
GLUCOSE BLD-MCNC: 61 MG/DL (ref 70–110)
GLUCOSE BLD-MCNC: 65 MG/DL (ref 70–110)
GLUCOSE BLD-MCNC: 94 MG/DL (ref 70–110)
GLUCOSE BLD-MCNC: <20 MG/DL (ref 70–110)
GLUCOSE SERPL-MCNC: 88 MG/DL (ref 74–99)
HCO3 BLDA-SCNC: 11 MMOL/L (ref 21–25)
HCT VFR BLD AUTO: 26.3 % (ref 34–46)
HGB BLD-MCNC: 8.5 GM/DL (ref 11.4–16)
LYMPHOCYTES # BLD MANUAL: 0.61 K/UL (ref 1–4.8)
MCH RBC QN AUTO: 30.7 PG (ref 25–35)
MCHC RBC AUTO-ENTMCNC: 32.3 G/DL (ref 31–37)
MCV RBC AUTO: 95 FL (ref 80–100)
METAMYELOCYTES # BLD: 0.51 K/UL
MONOCYTES # BLD MANUAL: 0.3 K/UL (ref 0–1)
MYELOCYTES # BLD MANUAL: 0.1 K/UL
NEUTROPHILS NFR BLD MANUAL: 70 %
NEUTS SEG # BLD MANUAL: 8.5 K/UL (ref 1.3–7.7)
PCO2 BLDA: 20 MMHG (ref 35–45)
PH BLDA: 7.33 [PH] (ref 7.35–7.45)
PH UR: 6.5 [PH] (ref 5–8)
PLATELET # BLD AUTO: 150 K/UL (ref 150–450)
PO2 BLDA: 62 MMHG (ref 83–108)
POTASSIUM SERPL-SCNC: 4.8 MMOL/L (ref 3.5–5.1)
PROT SERPL-MCNC: 4.1 G/DL (ref 6.3–8.2)
PROT UR QL: (no result)
RBC UR QL: 150 /HPF (ref 0–5)
SODIUM SERPL-SCNC: 132 MMOL/L (ref 137–145)
SP GR UR: 1.02 (ref 1–1.03)
UROBILINOGEN UR QL STRIP: <2 MG/DL (ref ?–2)
VANCOMYCIN SERPL-MCNC: 25.4 UG/ML
WBC # BLD AUTO: 10.1 K/UL (ref 3.8–10.6)
WBC #/AREA URNS HPF: >182 /HPF (ref 0–5)

## 2022-06-27 PROCEDURE — 3E043XZ INTRODUCTION OF VASOPRESSOR INTO CENTRAL VEIN, PERCUTANEOUS APPROACH: ICD-10-PCS

## 2022-06-27 PROCEDURE — 02H633Z INSERTION OF INFUSION DEVICE INTO RIGHT ATRIUM, PERCUTANEOUS APPROACH: ICD-10-PCS

## 2022-06-27 PROCEDURE — 5A0935A ASSISTANCE WITH RESPIRATORY VENTILATION, LESS THAN 24 CONSECUTIVE HOURS, HIGH NASAL FLOW/VELOCITY: ICD-10-PCS

## 2022-06-27 RX ADMIN — DULOXETINE SCH MG: 60 CAPSULE, DELAYED RELEASE ORAL at 09:27

## 2022-06-27 RX ADMIN — NOREPINEPHRINE BITARTRATE SCH MLS/HR: 1 INJECTION, SOLUTION, CONCENTRATE INTRAVENOUS at 11:53

## 2022-06-27 RX ADMIN — DEXTROSE MONOHYDRATE STA ML: 25 INJECTION, SOLUTION INTRAVENOUS at 18:51

## 2022-06-27 RX ADMIN — NOREPINEPHRINE BITARTRATE SCH MLS/HR: 1 INJECTION, SOLUTION, CONCENTRATE INTRAVENOUS at 16:15

## 2022-06-27 RX ADMIN — DEXTROSE MONOHYDRATE STA ML: 25 INJECTION, SOLUTION INTRAVENOUS at 11:53

## 2022-06-27 RX ADMIN — METOPROLOL TARTRATE SCH MG: 25 TABLET, FILM COATED ORAL at 09:28

## 2022-06-27 RX ADMIN — POTASSIUM CHLORIDE SCH MLS/HR: 14.9 INJECTION, SOLUTION INTRAVENOUS at 09:36

## 2022-06-27 RX ADMIN — FAMOTIDINE SCH MG: 20 TABLET, FILM COATED ORAL at 09:28

## 2022-06-27 RX ADMIN — DEXTROSE MONOHYDRATE ONE: 25 INJECTION, SOLUTION INTRAVENOUS at 13:36

## 2022-06-27 RX ADMIN — HEPARIN SODIUM SCH MLS/HR: 10000 INJECTION, SOLUTION INTRAVENOUS at 14:16

## 2022-06-27 RX ADMIN — DEXTROSE MONOHYDRATE ONE ML: 25 INJECTION, SOLUTION INTRAVENOUS at 18:24

## 2022-06-27 RX ADMIN — POTASSIUM CHLORIDE SCH: 14.9 INJECTION, SOLUTION INTRAVENOUS at 05:35

## 2022-06-27 RX ADMIN — ASPIRIN 81 MG CHEWABLE TABLET SCH MG: 81 TABLET CHEWABLE at 09:27

## 2022-06-27 RX ADMIN — OXYBUTYNIN CHLORIDE SCH MG: 5 TABLET ORAL at 09:36

## 2022-06-27 NOTE — XR
EXAMINATION TYPE: XR chest 1V portable

 

DATE OF EXAM: 6/27/2022

 

COMPARISON: 6/27/2022

 

HISTORY:   Central line placement 

 

TECHNIQUE: Single frontal view of the chest is obtained.

 

FINDINGS:  Bilateral lower lobe infiltrate with small effusion. No overt failure. No pneumothorax. Ca
lcific tendinosis of the left shoulder. Gastric bubble distended. Now is a left-sided central line wi
th the tip overlying the right atrium. No sizable pneumothorax. Surgical clips right upper quadrant.

 

 

IMPRESSION:  

1. Central line with the tip overlying the right atrium and no sizable pneumothorax.

2. Bilateral lower lobe infiltrate and small effusion.

3. Marked distention of the gastric bubble.

## 2022-06-27 NOTE — US
EXAMINATION TYPE: US kidneys/renal and bladder

 

DATE OF EXAM: 6/27/2022

 

COMPARISON: NONE

 

CLINICAL HISTORY: buffy. BUFFY 

 

EXAM MEASUREMENTS:

 

Right Kidney:  12.0 x 6.5 x 5.3 cm

Left Kidney: 11.8 x 5.2 x 4.1 cm

 

 

Right Kidney: lower pole obscured by overlying bowel content   

Left Kidney: no evidence of hydronephrosis    

Bladder: Aguilar Catheter 

 

No hydronephrosis or nephrolithiasis as visualized. See above regarding limitations in exam. Bladder 
nondistended due to Aguilar catheter and images were therefore not obtained by the technologist. Cortic
al medullary differentiation preserved.

 

IMPRESSION:

No hydronephrosis or nephrolithiasis.

## 2022-06-27 NOTE — P.PN
Subjective


Progress Note Date: 06/27/22











This is a 62-year-old female who presents to the emergency department, on June 25, complaining of lethargy, and left lower leg pain.  Patient was not able to 

give a very good history down in the emergency department.  The patient 

apparently was brought in for weakness, and inability to get off the toilet.  

She also left thigh pain.  The patient recently had a femoral/femoral bypass 

done at [facility], in March of this year.  She was evaluated by our 

vascular surgeon, and end up having an exploratory procedure in the left groin 

area, a thrombectomy of the femoral/femoral bypass, and wound cultures.  The 

vascular surgeon thought the patient should be transferred to [facility], to see her vascular surgeon.  Apparently, that may not occur before 

Monday.  Currently, she is on room air.  She's getting lactated Ringer's at 150 

mL an hour, heparin weight based protocol, and vancomycin IV.  White count 12.9,

hemoglobin 10.1, hematocrit 32.2, platelet count 100,000.  Sodium 135, potassium

5.1, chlorides 107, CO2 17, anion gap 11, BUN 45, and creatinine 3.07.  Lactic 

acid was 4.7.  Today it was 2.6.  Ionized calcium is a bit low.  She'll get an 

amp of calcium chloride or gluconate.  Nephrology has been consulted.  The CT 

angiogram of the lower abdomen and lower extremities, revealed failure of the 

femoral/femoral bypass graft to show any evidence of contrast opacification, wit

h trickle flow through the SFA, and nonvisualization of flow below the knee.








 on 06/27/2022, I'm seeing the patient for a follow-up. the patient is quite 

lethargic yet arousable and awake. she was  exploration of the left groin, 

thrombectomy of the femoral femoral bypass graft and cultures of the wound was 

also obtained.  Note that the left lung was incised and immediately upon 

entering into the skin, and bloody turbid fluid was encountered and the fluid 

was essentially purulent.  Cultures are positive for  capsula in the blood.  The

patient remains on examination of Zosyn and vancomycin this morning. the patient

as been quite hypotensive. the patient also developed an acute kidney injury and

the patient's creatinine is up to 4.22 with a BUN of 65.  The chest examination 

was showing pulmonary vessel congestion and increased pulmonary vessel markings 

secondary to fluid overload. the patient is currently on 6 L about 2 by nasal 

cannula.  Meanwhile, the rest of the electrodes showing a component of metabolic

acidosis.  The serum bicarbonate is down to 13 in the patient's sodium level is 

at 132.  The blood gases was done and showed a pH of 7.33 with a pCO2 of 20 and 

pO2 of 62 and this was done and FiO2 of 6 L nasal cannula. the white cell count 

of 10.1 with a hemoglobin of 8.5. the patient was already started on pressors 

and the patient is currently.  The patient is awaiting transfer to [facility] in [place] for further evaluation by her original vascular surgeon  

underwent her fem-fem bypass surgery.





Objective





- Vital Signs


Vital signs: 


                                   Vital Signs











Temp  99.2 F   06/27/22 04:00


 


Pulse  117 H  06/27/22 07:00


 


Resp  35 H  06/27/22 07:00


 


BP  104/76   06/27/22 07:00


 


Pulse Ox  100   06/27/22 06:00


 


FiO2      








                                 Intake & Output











 06/26/22 06/27/22 06/27/22





 18:59 06:59 18:59


 


Intake Total 2276.600 1966.548 150


 


Output Total 188 122 10


 


Balance 2088.600 1844.548 140


 


Weight  101.8 kg 


 


Intake:   


 


  IV 1800 1800 150


 


    Lactated Ringers 1,000 ml 1800 1800 150





    @ 150 mls/hr IV .Q6H40M   





    [facility] Rx#:[id number]   


 


  Intake, IV Titration 476.600 66.548 





  Amount   


 


    Calcium Gluconate in NaCl 100  





    1 gm In Saline 1 100ml.   





    bag @ 100 mls/hr IVPB   





    [facility] Rx#:[id number]   


 


    Heparin Sod,Pork in 0.45% 126.600 66.548 





    NaCl 25,000 unit In 0.45   





    % NaCl 1 250ml.bag @ 18   





    UNITS/KG/HR 16.166 mls/hr   





    IV .[id number] [facility] Rx#:   





    [id number]   


 


    Vancomycin 1,500 mg In 250  





    Sodium Chloride 0.9% 250   





    ml @ 125 mls/hr IVPB ONCE   





    [facility] Rx#:[id number]   


 


  Oral  100 


 


Output:   


 


  Urine 188 122 10


 


Other:   


 


  Voiding Method Indwelling Catheter Indwelling Catheter 








                       ABP, PAP, CO, CI - Last Documented











Arterial Blood Pressure        112/63

















- Exam











General appearance: The patient is  awake, lethargic, at times confused, 

breathing is slightly labored and the patient is currently on 6 L of oxygen by 

nasal cannula


 My normally had


HET: Head is normocephalic and atraumatic.  Pupils are equal and reactive.  


Neck: Supple without lymphadenopathy.  Trachea midline.  No audible carotid 

bruit.


Cardiac exam revealed the PMI to be normally situated and sized. The rhythm was 

regular and no extrasystoles were noted during several minutes of auscultation. 

The first and second heart sounds were normal and physiologic splitting of the 

second heart sound was noted. There were no murmurs, rubs, clicks, or gallops.


Lungs: Clear to auscultation bilaterally. the patient has bilateral crackles in 

the mid lower lung fields bilaterally.


Abdomen: Soft, tender to palpation, nondistended.


Extremities: Left foot with some mottling, cool to the touch.  Nonpalpable 

pulses.  Left lower extremity with femoral, popliteal and DP Doppler signal.  

Patient is able to move her foot and toes on the left lower extremity.


Neurological: confused, lethargic, moving all 4 extremities without any 

limitation








- Labs


CBC & Chem 7: 


                                 06/27/22 10:00





                                 06/27/22 04:48


Labs: 


                  Abnormal Lab Results - Last 24 Hours (Table)











  06/26/22 06/26/22 06/27/22 Range/Units





  12:20 14:30 04:48 


 


APTT   65.7 H   (22.0-30.0)  sec


 


Creatinine    4.22 H  (0.52-1.04)  mg/dL


 


Plasma Lactic Acid Cameron  3.1 H*    (0.7-2.0)  mmol/L














  06/27/22 Range/Units





  04:48 


 


APTT  62.3 H  (22.0-30.0)  sec


 


Creatinine   (0.52-1.04)  mg/dL


 


Plasma Lactic Acid Cameron   (0.7-2.0)  mmol/L








                      Microbiology - Last 24 Hours (Table)











 06/26/22 04:25 Blood Culture Gram Stain - Preliminary





 Blood Blood Culture - Preliminary





    Klebsiella pneumoniae


 


 06/25/22 16:45 Gram Stain - Preliminary





 Groin Tissue Culture - Preliminary


 


 06/25/22 16:45 Gram Stain - Preliminary





 Groin Wound Culture - Preliminary


 


 06/25/22 15:00 Urine Culture - Preliminary





 Urine,Voided    Gram Neg Bacilli


 


 06/26/22 04:25 Blood Culture - Final





 Blood 














Assessment and Plan


Plan: 











Postop day #2, S/P surgical exploration of left groin, thrombectomy of fem

oral/femoral bypass graft, and wound culture.the patient has infected fem-fem 

graft along with wound infection





Septic shock secondary to gram-negative bacteremia and the blood cultures 

positive for Klebsiella





Hypotension secondary to above, currently pressor dependent





Recent femoral/femoral bypass grafting, March 2022, at [facility].





acute kidney injury, with oligoria on the urine output is in order of 10-15 mL 

an hour and the patient has progressive worsening renal function





Metabolic acidosis, non-anion gap type





Severe peripheral vascular disease





acute leukocytosis, improving





Shock liver secondary to sepsis/hypotension





acute lactic acidosis, improving





Troponin leak secondary to above





History of hyperlipidemia.





Depression.





Chronic back pain.





Osteoporosis.





History of HPV.





History of cervical cancer, status post radiation therapy, 1993.





Plan: 





continue fluid resuscitation.  Switch this patient a bicarb infusion with D5 and

total amount of 50 mEq of sodium bicarbonate the rate of 150 mL an hour


Give the patient sodium bicarbonate 50 mEq IV push


Continue with pressors to maintain a mean artery pressure above 65


Stop the vancomycin and continue with IV Zosyn.


The patient's blood cultures positive for Klebsiella


Continue IV heparin


Keep the wound VAC in place


Blood gas was noted and is consistent with metabolic acidosis


nephrology consultation


The patient will be given a dose of Lasix 80 mg IV push improved urine output


Hold metoprolol regards to the blood pressure


Conditions extremity.  I will continue to follow and will make further recommen

dations based on her progress.  We'll also establish a triple lumen catheter.


Time with Patient: Greater than 30

## 2022-06-27 NOTE — P.PCN
Date of Procedure: 06/27/22


Preoperative Diagnosis: 


septic shock


Postoperative Diagnosis: 


septic shock


Procedure(s) Performed: 


insertion of a triple-lumen catheter


Anesthesia: local


Surgeon: Uriel Mccoy


Pathology: none sent


Condition: critical


Disposition: ICU


Operative Findings: 


 Indication:  Hemodynamic monitoring/Intravenous access.





A time-out was completed verifying correct patient, procedure, site, 

positioning, and implant(s) or special equipment if applicable.





The patient was placed in a dependent position appropriate for central line 

placement based on the vein to be cannulated.  The patients  left neck was 

prepped and draped in sterile fashion.  1% Lidocaine was used to anesthetize the

surrounding skin area.  A triple lumen 9F Cordis catheter was introduced into 

the internal jugular  vein using Seldinger technique.  The catheter was threaded

smoothly over the guide wire and appropriate blood return was obtained.  Each 

lumen of the catheter was evacuated of air and flushed with sterile saline.  The

catheter was then sutured in place to the skin and a sterile dressing applied.  

Perfusion to the extremity distal to the point of catheter insertion was checked

and found to be adequate.





The patient tolerated the procedure well and there were no complications.

## 2022-06-27 NOTE — P.PN
Subjective


Progress Note Date: 06/27/22


Principal diagnosis: 





Thrombosed femoral to femoral bypass graft with left lower extremity ischemia





This is 62-year-old female who presented to the emergency department with compla

ints of left lower extremity pain.  She has a history of peripheral vascular 

disease and had undergone a fem-fem bypass at [facility].  She is status 

post op day #2 for exploration of the left groin and thrombectomy of the 

femoral-femoral bypass graft.  Today she seen and evaluated and remains in the 

ICU.  She is complaining of abdominal pain.  Left lower extremity pain has 

improved.  She is able to move bilateral lower extremities.  Her Prevena 

dressing is in place with good suction.  Today's labs are currently pending.  

Blood cultures preliminary showing Klebsiella pneumoniae.  She is currently on 

IV vancomycin and Zosyn.  She remains on a heparin drip.  She is awaiting 

insurance authorization for transfer to higher level of care needing plastic 

surgeon.





Objective





- Vital Signs


Vital signs: 


                                   Vital Signs











Temp  99.2 F   06/27/22 04:00


 


Pulse  117 H  06/27/22 07:00


 


Resp  35 H  06/27/22 07:00


 


BP  104/76   06/27/22 07:00


 


Pulse Ox  100   06/27/22 06:00


 


FiO2      








                                 Intake & Output











 06/26/22 06/27/22 06/27/22





 18:59 06:59 18:59


 


Intake Total 2276.600 1966.548 150


 


Output Total 188 122 10


 


Balance 2088.600 1844.548 140


 


Weight  101.8 kg 


 


Intake:   


 


  IV 1800 1800 150


 


    Lactated Ringers 1,000 ml 1800 1800 150





    @ 150 mls/hr IV .Q6H40M   





    [facility] Rx#:[id number]   


 


  Intake, IV Titration 476.600 66.548 





  Amount   


 


    Calcium Gluconate in NaCl 100  





    1 gm In Saline 1 100ml.   





    bag @ 100 mls/hr IVPB   





    ON[facility] ONE Rx#:[id number]   


 


    Heparin Sod,Pork in 0.45% 126.600 66.548 





    NaCl 25,000 unit In 0.45   





    % NaCl 1 250ml.bag @ 18   





    UNITS/KG/HR 16.166 mls/hr   





    IV .[id number] [facility] Rx#:   





    [id number]   


 


    Vancomycin 1,500 mg In 250  





    Sodium Chloride 0.9% 250   





    ml @ 125 mls/hr IVPB ONCE   





    ONE Rx#:[id number]   


 


  Oral  100 


 


Output:   


 


  Urine 188 122 10


 


Other:   


 


  Voiding Method Indwelling Catheter Indwelling Catheter 








                       ABP, PAP, CO, CI - Last Documented











Arterial Blood Pressure        112/63

















- Exam





General appearance: The patient is alert, oriented, appears in no acute 

distress.


HET: Head is normocephalic and atraumatic.  Pupils are equal and reactive.  


Neck: Supple without lymphadenopathy.  Trachea midline.  No audible carotid 

bruit.


Heart: S1 S2.  Regular rate and rhythm.


Lungs: Clear to auscultation bilaterally.


Abdomen: Soft, tender to palpation, nondistended.


Extremities: Left foot with some mottling, cool to the touch.  Nonpalpable 

pulses.  Left lower extremity with femoral, popliteal and DP Doppler signal.  

Patient is able to move her foot and toes on the left lower extremity.


Neurological: Alert and oriented.





- Labs


CBC & Chem 7: 


                                 06/26/22 04:25





                                 06/27/22 04:48


Labs: 


                  Abnormal Lab Results - Last 24 Hours (Table)











  06/26/22 06/26/22 06/26/22 Range/Units





  08:08 08:08 12:20 


 


APTT  82.3 H    (22.0-30.0)  sec


 


Creatinine     (0.52-1.04)  mg/dL


 


Plasma Lactic Acid Cameron   2.6 H*  3.1 H*  (0.7-2.0)  mmol/L














  06/26/22 06/27/22 06/27/22 Range/Units





  14:30 04:48 04:48 


 


APTT  65.7 H   62.3 H  (22.0-30.0)  sec


 


Creatinine   4.22 H   (0.52-1.04)  mg/dL


 


Plasma Lactic Acid Cameron     (0.7-2.0)  mmol/L








                      Microbiology - Last 24 Hours (Table)











 06/26/22 04:25 Blood Culture Gram Stain - Preliminary





 Blood Blood Culture - Preliminary





    Klebsiella pneumoniae


 


 06/25/22 16:45 Gram Stain - Preliminary





 Groin Tissue Culture - Preliminary


 


 06/25/22 16:45 Gram Stain - Preliminary





 Groin Wound Culture - Preliminary


 


 06/25/22 15:00 Urine Culture - Preliminary





 Urine,Voided    Gram Neg Bacilli


 


 06/26/22 04:25 Blood Culture - Final





 Blood 














Assessment and Plan


Assessment: 





1.  Postop day #2 exploration of left groin and thrombectomy of femoral to 

femoral bypass graft


2.  Graft infection


3.  Thrombosed femoral-femoral bypass graft resulting in left lower extremity 

ischemia


4.  Abdominal pain


5.  Acute kidney injury


Plan: 





Patient is status post exploration and thrombectomy of left lower extremity 

femoral to femoral bypass graft.  Continue heparin drip, recommend transfer to 

[facility] where previous vascular surgery had taken place.  Dr. Brown

at [facility] in [place] was contacted by Dr. Garcia transfer was accepted 

for further management of care as well as need for plastic surgeon.  Continue 

antibiotics per recommendation from infectious disease.  This was discussed with

the patient and she is agreeable to transfer.  According to nursing they have 

discussed this with the family and they as well are agreeable to transfer.








Thank you for this consultation, we will continue to follow.





The impression and plan of care has been dictated as directed.








I performed a history and examination of this patient,  discussed the same with 

the dictator.  I agree with the dictator's note ,documented as a scribe.  Any 

additional findings or plans will be noted.

## 2022-06-27 NOTE — XR
EXAMINATION TYPE: XR chest 1V

 

DATE OF EXAM: 6/27/2022

 

COMPARISON: 6/25/2022

 

HISTORY: Shortness of breath

 

TECHNIQUE: Single frontal view of the chest is obtained.

 

FINDINGS:  Bilateral lower lobe infiltrate with small effusion. No overt failure. No pneumothorax. Ca
lcific tendinosis of the left shoulder.

 

IMPRESSION:  Bilateral lower lobe infiltrate and small effusion.
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
## 2022-06-27 NOTE — XR
EXAMINATION TYPE: XR abdomen 1V

 

DATE OF EXAM: 6/27/2022

 

COMPARISON: NONE

 

HISTORY: Pain

 

TECHNIQUE: One view abdominal series

 

FINDINGS:  

The osseous structures are intact.  The bowel gas pattern is nonspecific. Dilated small bowel loops a
re seen. Vascular stent overlying the right iliac region and they're suggestive of a metallic density
 overlying the lumbar spine likely postsurgical. Surgical clips in the pelvis and arthropathy of the 
hips with diffuse osteopenia. A gastric bubble markedly distended and there is right basilar subsegme
ntal infiltrate.

 

IMPRESSION:

1. Marked distention of the gastric bubble and small bowel loops correlate for bowel obstruction.

2. Right lower lobe atelectasis or infiltrate.
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
